# Patient Record
Sex: MALE | Race: WHITE
[De-identification: names, ages, dates, MRNs, and addresses within clinical notes are randomized per-mention and may not be internally consistent; named-entity substitution may affect disease eponyms.]

---

## 2021-01-27 ENCOUNTER — HOSPITAL ENCOUNTER (OUTPATIENT)
Dept: HOSPITAL 33 - SDC-PAIN | Age: 76
Discharge: HOME | End: 2021-01-27
Attending: PSYCHIATRY & NEUROLOGY
Payer: MEDICARE

## 2021-01-27 DIAGNOSIS — M60.9: ICD-10-CM

## 2021-01-27 DIAGNOSIS — M54.16: Primary | ICD-10-CM

## 2021-01-27 DIAGNOSIS — I10: ICD-10-CM

## 2021-01-27 DIAGNOSIS — Z79.899: ICD-10-CM

## 2021-01-27 DIAGNOSIS — E78.5: ICD-10-CM

## 2021-01-27 DIAGNOSIS — M79.18: ICD-10-CM

## 2021-01-27 PROCEDURE — 72020 X-RAY EXAM OF SPINE 1 VIEW: CPT

## 2021-01-27 PROCEDURE — 99100 ANES PT EXTEME AGE<1 YR&>70: CPT

## 2021-01-27 PROCEDURE — 64483 NJX AA&/STRD TFRM EPI L/S 1: CPT

## 2021-01-27 PROCEDURE — 64484 NJX AA&/STRD TFRM EPI L/S EA: CPT

## 2021-01-27 PROCEDURE — 72100 X-RAY EXAM L-S SPINE 2/3 VWS: CPT

## 2021-01-27 PROCEDURE — 77002 NEEDLE LOCALIZATION BY XRAY: CPT

## 2021-01-27 PROCEDURE — 77003 FLUOROGUIDE FOR SPINE INJECT: CPT

## 2021-01-27 PROCEDURE — 20552 NJX 1/MLT TRIGGER POINT 1/2: CPT

## 2021-01-27 NOTE — XRAY
Indication: Left L4-S1 transforaminal TK.



Intraoperative fluoroscopy was provided for 39 seconds.  5 digital spot images

submitted for interpretation demonstrates posterior needle tips projecting

over the expected left L4 and L5 nerve roots.  Small amount of contrast

injected for needle tip placement.  Correlate with intraoperative

findings/report.

## 2021-01-27 NOTE — XRAY
Indication: Left piriformis injection.



Intraoperative fluoroscopy was provided for 16 seconds.  Single digital spot

image submitted for interpretation demonstrates posterior needle tip

projecting over the expected left piriformis muscle.  Small amount of contrast

injected for needle tip placement.  Correlate with intraoperative

findings/report.